# Patient Record
Sex: MALE | Race: BLACK OR AFRICAN AMERICAN
[De-identification: names, ages, dates, MRNs, and addresses within clinical notes are randomized per-mention and may not be internally consistent; named-entity substitution may affect disease eponyms.]

---

## 2018-01-31 ENCOUNTER — HOSPITAL ENCOUNTER (EMERGENCY)
Dept: HOSPITAL 17 - PHEFT | Age: 58
Discharge: HOME | End: 2018-01-31
Payer: OTHER GOVERNMENT

## 2018-01-31 VITALS — BODY MASS INDEX: 27.28 KG/M2 | HEIGHT: 66 IN | WEIGHT: 169.76 LBS

## 2018-01-31 VITALS
RESPIRATION RATE: 16 BRPM | OXYGEN SATURATION: 96 % | DIASTOLIC BLOOD PRESSURE: 74 MMHG | HEART RATE: 115 BPM | TEMPERATURE: 98.9 F | SYSTOLIC BLOOD PRESSURE: 150 MMHG

## 2018-01-31 VITALS — RESPIRATION RATE: 16 BRPM | OXYGEN SATURATION: 96 % | TEMPERATURE: 99.4 F | HEART RATE: 110 BPM

## 2018-01-31 DIAGNOSIS — M79.1: ICD-10-CM

## 2018-01-31 DIAGNOSIS — E11.9: ICD-10-CM

## 2018-01-31 DIAGNOSIS — N39.0: Primary | ICD-10-CM

## 2018-01-31 DIAGNOSIS — B96.20: ICD-10-CM

## 2018-01-31 DIAGNOSIS — M54.9: ICD-10-CM

## 2018-01-31 DIAGNOSIS — J40: ICD-10-CM

## 2018-01-31 DIAGNOSIS — G89.29: ICD-10-CM

## 2018-01-31 LAB
AMORPHOUS SEDIMENT, URINE: (no result)
BACTERIA #/AREA URNS HPF: (no result) /HPF
COLOR UR: YELLOW
GLUCOSE UR STRIP-MCNC: (no result) MG/DL
HGB UR QL STRIP: (no result)
KETONES UR STRIP-MCNC: (no result) MG/DL
LEUKOCYTE ESTERASE UR QL STRIP: (no result) /HPF (ref 0–5)
NITRITE UR QL STRIP: (no result)
RBC #/AREA URNS HPF: (no result) /HPF (ref 0–3)
SP GR UR STRIP: 1.02 (ref 1–1.03)
SQUAMOUS #/AREA URNS HPF: (no result) /HPF (ref 0–5)
URINE LEUKOCYTE ESTERASE: (no result)
WHITE BLOOD CELL CLUMPS: (no result)

## 2018-01-31 PROCEDURE — 81001 URINALYSIS AUTO W/SCOPE: CPT

## 2018-01-31 PROCEDURE — 87804 INFLUENZA ASSAY W/OPTIC: CPT

## 2018-01-31 PROCEDURE — 71045 X-RAY EXAM CHEST 1 VIEW: CPT

## 2018-01-31 PROCEDURE — 87077 CULTURE AEROBIC IDENTIFY: CPT

## 2018-01-31 PROCEDURE — 87086 URINE CULTURE/COLONY COUNT: CPT

## 2018-01-31 PROCEDURE — 94640 AIRWAY INHALATION TREATMENT: CPT

## 2018-01-31 PROCEDURE — 99284 EMERGENCY DEPT VISIT MOD MDM: CPT

## 2018-01-31 PROCEDURE — 87186 SC STD MICRODIL/AGAR DIL: CPT

## 2018-01-31 PROCEDURE — 94664 DEMO&/EVAL PT USE INHALER: CPT

## 2018-01-31 RX ADMIN — IPRATROPIUM BROMIDE AND ALBUTEROL SULFATE SCH AMPULE: .5; 3 SOLUTION RESPIRATORY (INHALATION) at 10:40

## 2018-01-31 NOTE — PD
HPI


Chief Complaint:  Cold / Flu Symptoms


Time Seen by Provider:  10:19


Travel History


International Travel<30 days:  No


Contact w/Intl Traveler<30days:  No


Traveled to known affect area:  No





History of Present Illness


HPI


57-year-old male with history of diabetes presents for evaluation of cough, 

myalgias, congestion.  For the past 4 weeks he has had these symptoms.  He was 

seen [facility], diagnosed bronchitis, 

prescribed an unknown antibiotic as well as prednisone for 3 day treatment.  He 

was also given albuterol nebulizer.  His symptoms initially improved but over 

the past 2 days his symptoms have worsened again which prompted evaluation.  

Cough is productive with yellow sputum.  No aggravating or alleviating factors.

  He also reports a discomfort when he urinates which she would not describe 

his pain but he reports that he has had urinary tract infections in the past 

and this feels similar.  He denies any urinary hesitancy or increased urinary 

frequency.  He denies abdominal pain, flank pain, testicular or scrotal pain, 

urethral discharge.  He has no other complaints at this time.





PFSH


Past Medical History


Arthritis:  Yes


Chest Pain:  Yes


Diabetes:  Yes (metformin)


Patient Takes Glucophage:  Yes


Diminished Hearing:  No


GERD:  Yes


Genitourinary:  Yes


Musculoskeletal:  Yes (CHRONIC BACK PAIN, Right Shoulder Rotater Cuff)


Respiratory:  Yes (asthma as a child)


Immunizations Current:  Yes


Ulcer:  Yes


Influenza Vaccination:  No





Past Surgical History


Abdominal Surgery:  Yes (HERNIA REPAIR  X 2)


Genitourinary Surgery:  Yes (stricture with urethroplasty with





 graft From gums)


Other Surgery:  Yes (CYST REMOVAL FROM CHEST)





Social History


Alcohol Use:  Yes (SOCIALLY)


Tobacco Use:  No (NEVER)


Substance Use:  No





Allergies-Medications


(Allergen,Severity, Reaction):  


Coded Allergies:  


     egg (Unverified  Allergy, Severe, GI SYMPTOMS, 1/31/18)


     feathers (Unverified  Allergy, Unknown, GI SYMPTOMS, 1/31/18)


     *MDRO Multi-Drug Resistant Organism (Verified  Adverse Reaction, Unknown, 4 /21/16)


 ESBL+E.Coli urine 4/2016


Reported Meds & Prescriptions





Reported Meds & Active Scripts


Active


Reported


Metformin (Metformin HCl) 500 Mg Tab 500 Mg PO BIDPC








Review of Systems


Except as stated in HPI:  all other systems reviewed are Neg





Physical Exam


Narrative


GENERAL: Well-developed well-nourished male in no acute distress.  Tachycardic 

in triage.


SKIN: Warm and dry.


HEAD: Atraumatic. Normocephalic. 


EYES: Pupils equal and round. No scleral icterus. No injection or drainage. 


ENT: No nasal bleeding or discharge.  Mucous membranes pink and moist.


NECK: Trachea midline. No JVD.  No lymphadenopathy.  Neck supple full range of 

motion.


CARDIOVASCULAR: Regular rate and rhythm.  No murmur appreciated.


RESPIRATORY: No accessory muscle use.  End expiratory wheezing bilaterally.


GASTROINTESTINAL: Abdomen soft, non-tender, nondistended. Hepatic and splenic 

margins not palpable. 


MUSCULOSKELETAL: No obvious deformities. No clubbing.  No cyanosis.  No edema. 


NEUROLOGICAL: Awake and alert. No obvious cranial nerve deficits.  Motor 

grossly within normal limits. Normal speech.


PSYCHIATRIC: Appropriate mood and affect; insight and judgment normal.





Data


Data


Last Documented VS





Vital Signs








  Date Time  Temp Pulse Resp B/P (MAP) Pulse Ox O2 Delivery O2 Flow Rate FiO2


 


1/31/18 11:06 99.4 110 16  96 Room Air  


 


1/31/18 09:53    150/74 (99)    








Orders





 Orders


Albuterol-Ipratropium Neb (Duoneb Neb) (1/31/18 10:30)


Influenzae A/B Antigen (1/31/18 10:25)


Ibuprofen (Motrin) (1/31/18 10:30)


Chest, Single Ap (1/31/18 )


Urinalysis - C+S If Indicated (1/31/18 11:04)


Urine Culture (1/31/18 11:20)





Labs





Laboratory Tests








Test


  1/31/18


11:20


 


Urine Collection Type CLEAN CATCH 


 


Urine Color YELLOW 


 


Urine Turbidity SLIGHT 


 


Urine pH 7.5 


 


Urine Specific Gravity 1.021 


 


Urine Protein TRACE mg/dL 


 


Urine Glucose (UA) NEG mg/dL 


 


Urine Ketones NEG mg/dL 


 


Urine Occult Blood TRACE 


 


Urine Nitrite NEG 


 


Urine Bilirubin NEG 


 


Urine Leukocyte Esterase LARGE 


 


Urine RBC 0-3 /hpf 


 


Urine -200 /hpf 


 


Urine WBC Clumps MOD 


 


Urine Squamous Epithelial


Cells 0-5 /hpf 


 


 


Urine Amorphous Sediment FEW 


 


Urine Bacteria FEW /hpf 


 


Microscopic Urinalysis Comment


  CULTURE


INDICATED


 


Urine Collection Time 1120 











MDM


Medical Decision Making


Medical Screen Exam Complete:  Yes


Emergency Medical Condition:  Yes


Medical Record Reviewed:  Yes


Differential Diagnosis


Pneumonia, influenza, urinary tract infection, bronchitis, reactive airway 

disease


Narrative Course


The patient be given DuoNeb treatment for wheezing.  Ibuprofen for myalgias.  

Chest x-ray, urinalysis and influenza antigen been ordered.





Chest x-ray influenza antigen are negative.  Urinalysis is consistent with 

urinary tract infection.  I reviewed his records.  He has a history of urethral 

strictures, last seen here for her urinary tract infection in 2016.  Urine 

culture at that time grew ESBL Escherichia coli resistant to numerous 

antibiotics, but based on previous culture results sensitive to Augmentin.  

Upon examination the patient feels slightly improved.  He will be discharged 

with a ten-day course of Augmentin.  He would also benefit from a longer 

steroid taper for his wheezing.  He will be given a Medrol Dosepak.  Discussed 

signs and symptoms that would warrant returning to the emergency room.  He is 

stable for discharge.





Diagnosis





 Primary Impression:  


 Urinary tract infection


 Additional Impression:  


 Bronchitis





***Additional Instructions:  


Medication as prescribed.  Stay well-hydrated and well-nourished, get plenty of 

rest.  Take Tylenol or Motrin for fever and body ache per dosing instructions 

on the bottle.  Follow-up with primary care physician as needed and return for 

any emergent medical conditions.


***Med/Other Pt SpecificInfo:  Prescription(s) given


Scripts


Methylprednisolone Dosepak (Medrol Dosepak) 4 Mg Dspk


4 MG PO AS DIRECTED, #1 DSPK 0 Refills


   Per Pharmacist direction


   Prov: Michael Flor MD         1/31/18 


Amoxicillin-Clavulanate (Augmentin) 875-125 Mg Tab


1 TAB PO BID for Infection for 10 Days, #20 TAB 0 Refills


   Prov: Michael Flor MD         1/31/18


Disposition:  01 DISCHARGE HOME


Condition:  Stable











Derrick Baker Jan 31, 2018 10:28

## 2018-01-31 NOTE — RADRPT
EXAM DATE/TIME:  01/31/2018 10:57 

 

[facility] COMPARISON:     

CHEST SINGLE AP, April 18, 2016, 21:43.

 

                     

INDICATIONS :     

Cough, congestion, chest tightness, short of breath.

                     

 

MEDICAL HISTORY :     

None.          

 

SURGICAL HISTORY :        

cyst removed from chest

 

ENCOUNTER:     

Initial                                        

 

ACUITY:     

2 days      

 

PAIN SCORE:     

2/10

 

LOCATION:     

Bilateral chest 

 

FINDINGS:     

A single view of the chest demonstrates the lungs to be symmetrically aerated without evidence of mas
s, infiltrate or effusion.  The cardiomediastinal contours are unremarkable.  Osseous structures are 
intact.

 

CONCLUSION:     Normal examination.  

 

 

 

 Teodoro Koch Jr., MD on January 31, 2018 at 11:07           

Board Certified Radiologist.

 This report was verified electronically.